# Patient Record
Sex: MALE | Race: WHITE | NOT HISPANIC OR LATINO | URBAN - METROPOLITAN AREA
[De-identification: names, ages, dates, MRNs, and addresses within clinical notes are randomized per-mention and may not be internally consistent; named-entity substitution may affect disease eponyms.]

---

## 2023-10-02 ENCOUNTER — OFFICE VISIT (OUTPATIENT)
Dept: URBAN - METROPOLITAN AREA CLINIC 25 | Facility: CLINIC | Age: 33
End: 2023-10-02
Payer: COMMERCIAL

## 2023-10-02 ENCOUNTER — WEB ENCOUNTER (OUTPATIENT)
Dept: URBAN - METROPOLITAN AREA CLINIC 25 | Facility: CLINIC | Age: 33
End: 2023-10-02

## 2023-10-02 VITALS
HEART RATE: 75 BPM | WEIGHT: 204.6 LBS | DIASTOLIC BLOOD PRESSURE: 83 MMHG | HEIGHT: 72 IN | SYSTOLIC BLOOD PRESSURE: 135 MMHG | TEMPERATURE: 98.1 F | BODY MASS INDEX: 27.71 KG/M2

## 2023-10-02 DIAGNOSIS — R19.7 DIARRHEA, UNSPECIFIED TYPE: ICD-10-CM

## 2023-10-02 DIAGNOSIS — R17 JAUNDICE: ICD-10-CM

## 2023-10-02 DIAGNOSIS — R19.4 CHANGE IN BOWEL HABITS: ICD-10-CM

## 2023-10-02 DIAGNOSIS — R79.89 ABNORMAL LFTS: ICD-10-CM

## 2023-10-02 DIAGNOSIS — E80.6 HYPERBILIRUBINEMIA: ICD-10-CM

## 2023-10-02 PROBLEM — 14783006: Status: ACTIVE | Noted: 2023-10-02

## 2023-10-02 PROCEDURE — 99204 OFFICE O/P NEW MOD 45 MIN: CPT | Performed by: INTERNAL MEDICINE

## 2023-10-03 ENCOUNTER — WEB ENCOUNTER (OUTPATIENT)
Dept: URBAN - METROPOLITAN AREA CLINIC 25 | Facility: CLINIC | Age: 33
End: 2023-10-03

## 2023-10-04 ENCOUNTER — WEB ENCOUNTER (OUTPATIENT)
Dept: URBAN - METROPOLITAN AREA CLINIC 25 | Facility: CLINIC | Age: 33
End: 2023-10-04

## 2023-10-05 ENCOUNTER — WEB ENCOUNTER (OUTPATIENT)
Dept: URBAN - METROPOLITAN AREA CLINIC 25 | Facility: CLINIC | Age: 33
End: 2023-10-05

## 2023-10-06 ENCOUNTER — WEB ENCOUNTER (OUTPATIENT)
Dept: URBAN - METROPOLITAN AREA CLINIC 25 | Facility: CLINIC | Age: 33
End: 2023-10-06

## 2023-10-27 ENCOUNTER — CLAIMS CREATED FROM THE CLAIM WINDOW (OUTPATIENT)
Dept: URBAN - METROPOLITAN AREA CLINIC 105 | Facility: CLINIC | Age: 33
End: 2023-10-27
Payer: COMMERCIAL

## 2023-10-27 VITALS
SYSTOLIC BLOOD PRESSURE: 146 MMHG | WEIGHT: 205.6 LBS | HEIGHT: 72 IN | TEMPERATURE: 97.3 F | DIASTOLIC BLOOD PRESSURE: 76 MMHG | BODY MASS INDEX: 27.85 KG/M2 | HEART RATE: 88 BPM

## 2023-10-27 DIAGNOSIS — R79.89 ELEVATED LFTS: ICD-10-CM

## 2023-10-27 DIAGNOSIS — L29.9 PRURITUS: ICD-10-CM

## 2023-10-27 DIAGNOSIS — K71.9 HEPATOTOXICITY: ICD-10-CM

## 2023-10-27 PROCEDURE — 99244 OFF/OP CNSLTJ NEW/EST MOD 40: CPT

## 2023-10-27 PROCEDURE — 99244 OFF/OP CNSLTJ NEW/EST MOD 40: CPT | Performed by: INTERNAL MEDICINE

## 2023-10-27 PROCEDURE — 99204 OFFICE O/P NEW MOD 45 MIN: CPT | Performed by: INTERNAL MEDICINE

## 2023-10-27 RX ORDER — COLESTIPOL HYDROCHLORIDE 1 G/1
2 TABLETS TABLET, FILM COATED ORAL
Qty: 60 | Refills: 0 | OUTPATIENT
Start: 2023-10-27

## 2023-10-27 RX ORDER — URSODIOL 500 MG/1
1 TABLET TABLET ORAL
Qty: 60 | Refills: 0 | OUTPATIENT
Start: 2023-10-27

## 2023-10-27 NOTE — HPI-TODAY'S VISIT:
New patient 42 yo male with no PMH here for evaluation of elevated liver enzymes. He was referred by PCP Dr. Schmitz. A copy of this progress note will be sent to the referring provider.  Went to [facility] [place] [facility]  and has been feeling sick since. Patient states he only had a max of 1 drink per day during [facility]. He is unsure if he drank any local water. Denies hx of blood transfusions and known hepatitis exposure. Symptoms include Fatigue, periumbilical "tingling", yellowing of skin and eyes,  (resolved 2 weeks ago) and itching of skin. Had yellow-tinted diarrhea that resolved in the past 2 weeks.  Denies nausea and vomiting  Went UC and tested positive for elevated bilirubin in [facility] on 09/28/2023 showed RBC 6.35 MCV 68.7 MCH 23.3 Creatinine 1.72 Potassium 5.4 Total bilirubin 9.2 alk phos 142 AST 99  chol 333 HDL 10 Triglycerides 284  chol/HDL ratio 33.3, non-HDL chol 323. Test neg for Hep A IgM, Hep B surface Ag, Hep B core, Hep C Ab, Hep E Igg and IgM. RUQ US unremarkable. Patient is constantly scratching skin in office today. States it bothers him at night and hinders him from sleeping. He was prescribed an antihistamine by UC provider with no relief.   Patient has been taking Oxandrolone (anabolic steroid) for several months prior to this incident. States it was for muscle building. It has SE of elevated liver enzymes, cholesterol, bilirubin and hepatotoxicity

## 2023-10-27 NOTE — PHYSICAL EXAM CONSTITUTIONAL:
well developed, well nourished , in mildly acute distress , ambulating without difficulty , normal communication ability

## 2023-10-28 LAB
A/G RATIO: 1.3
ABSOLUTE BASOPHILS: 40
ABSOLUTE EOSINOPHILS: 615
ABSOLUTE LYMPHOCYTES: 1880
ABSOLUTE MONOCYTES: 445
ABSOLUTE NEUTROPHILS: 2020
ALBUMIN: 3.8
ALKALINE PHOSPHATASE: 153
ALT (SGPT): 201
AST (SGOT): 101
BASOPHILS: 0.8
BILIRUBIN, TOTAL: 2.2
BILIRUBIN, TOTAL: 2.2
BUN/CREATININE RATIO: (no result)
BUN: 22
CALCIUM: 9.5
CARBON DIOXIDE, TOTAL: 29
CHLORIDE: 103
CREATININE: 1.24
EGFR: 79
EOSINOPHILS: 12.3
GLOBULIN, TOTAL: 3
GLUCOSE: 93
HEMATOCRIT: 44
HEMOGLOBIN: 14.6
LYMPHOCYTES: 37.6
MCH: 24.8
MCHC: 33.2
MCV: 74.7
MONOCYTES: 8.9
MPV: 11
NEUTROPHILS: 40.4
PLATELET COUNT: 442
POTASSIUM: 4.5
PROTEIN, TOTAL: 6.8
RDW: 18.2
RED BLOOD CELL COUNT: 5.89
SODIUM: 141
WHITE BLOOD CELL COUNT: 5

## 2023-10-30 ENCOUNTER — TELEPHONE ENCOUNTER (OUTPATIENT)
Dept: URBAN - METROPOLITAN AREA CLINIC 105 | Facility: CLINIC | Age: 33
End: 2023-10-30

## 2023-10-30 ENCOUNTER — LAB OUTSIDE AN ENCOUNTER (OUTPATIENT)
Dept: URBAN - METROPOLITAN AREA CLINIC 105 | Facility: CLINIC | Age: 33
End: 2023-10-30

## 2023-10-31 LAB
EBV NUCLEAR AG (EBNA) AB (IGG): 407
EBV VIRAL CAPSID AG (VCA) AB (IGG): >750
EBV VIRAL CAPSID AG (VCA) AB (IGM): <36

## 2024-01-12 ENCOUNTER — OFFICE VISIT (OUTPATIENT)
Dept: URBAN - METROPOLITAN AREA CLINIC 105 | Facility: CLINIC | Age: 34
End: 2024-01-12
Payer: COMMERCIAL

## 2024-01-12 ENCOUNTER — DASHBOARD ENCOUNTERS (OUTPATIENT)
Age: 34
End: 2024-01-12

## 2024-01-12 VITALS
HEIGHT: 72 IN | WEIGHT: 193.6 LBS | SYSTOLIC BLOOD PRESSURE: 117 MMHG | DIASTOLIC BLOOD PRESSURE: 71 MMHG | HEART RATE: 71 BPM | TEMPERATURE: 97.2 F | BODY MASS INDEX: 26.22 KG/M2

## 2024-01-12 DIAGNOSIS — K71.9 HEPATOTOXICITY: ICD-10-CM

## 2024-01-12 DIAGNOSIS — R79.89 ELEVATED LFTS: ICD-10-CM

## 2024-01-12 DIAGNOSIS — R71.8 LOW MEAN CORPUSCULAR VOLUME (MCV): ICD-10-CM

## 2024-01-12 DIAGNOSIS — L29.9 PRURITUS: ICD-10-CM

## 2024-01-12 PROBLEM — 197354009: Status: ACTIVE | Noted: 2023-10-27

## 2024-01-12 PROCEDURE — 99213 OFFICE O/P EST LOW 20 MIN: CPT | Performed by: INTERNAL MEDICINE

## 2024-01-12 RX ORDER — COLESTIPOL HYDROCHLORIDE 1 G/1
2 TABLETS TABLET, FILM COATED ORAL
Qty: 60 | Refills: 0 | Status: ACTIVE | COMMUNITY
Start: 2023-10-27

## 2024-01-12 RX ORDER — URSODIOL 500 MG/1
1 TABLET TABLET ORAL
Qty: 60 | Refills: 0 | Status: ACTIVE | COMMUNITY
Start: 2023-10-27

## 2024-01-12 NOTE — HPI-TODAY'S VISIT:
10/27/2023 New patient 44 yo male with no PMH here for evaluation of elevated liver enzymes. He was referred by PCP Dr. Schmitz. A copy of this progress note will be sent to the referring provider.  Went to [facility] in August in [facility] for 5 days  and has been feeling sick since. Patient states he only had a max of 1 drink per day during [facility]. He is unsure if he drank any local water. Denies hx of blood transfusions and known hepatitis exposure. Symptoms include Fatigue, periumbilical "tingling", yellowing of skin and eyes,  (resolved 2 weeks ago) and itching of skin. Had yellow-tinted diarrhea that resolved in the past 2 weeks.  Denies nausea and vomiting  Went UC and tested positive for elevated bilirubin in [facility] on 09/28/2023 showed RBC 6.35 MCV 68.7 MCH 23.3 Creatinine 1.72 Potassium 5.4 Total bilirubin 9.2 alk phos 142 AST 99  chol 333 HDL 10 Triglycerides 284  chol/HDL ratio 33.3, non-HDL chol 323. Test neg for Hep A IgM, Hep B surface Ag, Hep B core, Hep C Ab, Hep E Igg and IgM. RUQ US unremarkable. Patient is constantly scratching skin in office today. States it bothers him at night and hinders him from sleeping. He was prescribed an antihistamine by UC provider with no relief. Patient has been taking Oxandrolone (anabolic steroid) for several months prior to this incident. States it was for muscle building. It has SE of elevated liver enzymes, cholesterol, bilirubin and hepatotoxicity  01/12/2024 Patient here as a follow up.  Reports no tea-colored urine, light- coloerd stools, RUQ pain, nausea, vomiting, jaundice or pruitis.  He is curerntly taking the anabolic steriods.  He has no other complaints

## 2024-01-23 LAB
A/G RATIO: 1.6
ABSOLUTE BASOPHILS: 39
ABSOLUTE EOSINOPHILS: 88
ABSOLUTE LYMPHOCYTES: 1866
ABSOLUTE MONOCYTES: 277
ABSOLUTE NEUTROPHILS: 1232
ACTIN (SMOOTH MUSCLE) ANTIBODY (IGG): <20
ALBUMIN: 4.2
ALKALINE PHOSPHATASE: 66
ALPHA-1-ANTITRYPSIN QN: 144
ALT (SGPT): 34
ANA SCREEN, IFA: NEGATIVE
AST (SGOT): 25
BASOPHILS: 1.1
BILIRUBIN, TOTAL: 0.4
BUN/CREATININE RATIO: (no result)
BUN: 20
CALCIUM: 9.4
CARBON DIOXIDE, TOTAL: 25
CHLORIDE: 102
CREATININE: 1.23
EGFR: 80
EOSINOPHILS: 2.5
FERRITIN, SERUM: 56
GLOBULIN, TOTAL: 2.7
GLUCOSE: 58
HEMATOCRIT: 46.2
HEMOGLOBIN: 15
HEREDITARY HEMOCHROMATOSIS DNA MUT: (no result)
IRON BIND.CAP.(TIBC): 309
IRON SATURATION: 30
IRON: 93
LKM-1 ANTIBODY (IGG): <=20
LYMPHOCYTES: 53.3
MCH: 25.2
MCHC: 32.5
MCV: 77.5
MITOCHONDRIAL (M2) ANTIBODY: <=20
MONOCYTES: 7.9
MPV: 11
NEUTROPHILS: 35.2
PLATELET COUNT: 290
POTASSIUM: 5.2
PROTEIN, TOTAL: 6.9
RDW: 13.5
RED BLOOD CELL COUNT: 5.96
SODIUM: 144
WHITE BLOOD CELL COUNT: 3.5